# Patient Record
Sex: FEMALE | Race: WHITE | NOT HISPANIC OR LATINO | URBAN - METROPOLITAN AREA
[De-identification: names, ages, dates, MRNs, and addresses within clinical notes are randomized per-mention and may not be internally consistent; named-entity substitution may affect disease eponyms.]

---

## 2022-02-07 ENCOUNTER — APPOINTMENT (OUTPATIENT)
Dept: WOMENS IMAGING | Facility: HOSPITAL | Age: 52
End: 2022-02-07

## 2022-02-07 PROCEDURE — 77067 SCR MAMMO BI INCL CAD: CPT | Performed by: RADIOLOGY

## 2022-02-07 PROCEDURE — 77063 BREAST TOMOSYNTHESIS BI: CPT | Performed by: RADIOLOGY

## 2024-12-04 ENCOUNTER — TELEPHONE (OUTPATIENT)
Dept: FAMILY MEDICINE CLINIC | Facility: CLINIC | Age: 54
End: 2024-12-04

## 2024-12-04 NOTE — TELEPHONE ENCOUNTER
Caller: Shantel Kang    Relationship to patient: Self    Best call back number: [phone]    Chief complaint: NEW PATIENT,DISCUSS WEIGHT LOSS    Type of visit: NEW PATIENT    Requested date: ANYDAY AFTERNOONS     Additional notes:SHANTEL WAS REFERRED TO [facility] BY CORIE (NOT SURE ON EXACT SPELLING OF FIRST NAME) WHO IS A CURRENT PATIENT. SHE IS WANTING TO KNOW IF [facility] IS WILLING TO TAKE HER ON AS A NEW PATIENT TO PRIMARILY DISCUSS WEIGHT LOSS. PLEASE CALLBACK WITH DECISION.